# Patient Record
Sex: MALE | Race: WHITE
[De-identification: names, ages, dates, MRNs, and addresses within clinical notes are randomized per-mention and may not be internally consistent; named-entity substitution may affect disease eponyms.]

---

## 2020-03-24 ENCOUNTER — HOSPITAL ENCOUNTER (EMERGENCY)
Dept: HOSPITAL 25 - UCCORT | Age: 45
Discharge: HOME | End: 2020-03-24
Payer: SELF-PAY

## 2020-03-24 VITALS — DIASTOLIC BLOOD PRESSURE: 63 MMHG | SYSTOLIC BLOOD PRESSURE: 115 MMHG

## 2020-03-24 DIAGNOSIS — M79.10: ICD-10-CM

## 2020-03-24 DIAGNOSIS — R50.9: ICD-10-CM

## 2020-03-24 DIAGNOSIS — R07.89: ICD-10-CM

## 2020-03-24 DIAGNOSIS — F17.210: ICD-10-CM

## 2020-03-24 DIAGNOSIS — J02.9: Primary | ICD-10-CM

## 2020-03-24 DIAGNOSIS — Z20.828: ICD-10-CM

## 2020-03-24 DIAGNOSIS — R05: ICD-10-CM

## 2020-03-24 PROCEDURE — 99212 OFFICE O/P EST SF 10 MIN: CPT

## 2020-03-24 PROCEDURE — G0463 HOSPITAL OUTPT CLINIC VISIT: HCPCS

## 2020-03-24 PROCEDURE — 87651 STREP A DNA AMP PROBE: CPT

## 2020-03-24 PROCEDURE — U0002 COVID-19 LAB TEST NON-CDC: HCPCS

## 2020-03-24 NOTE — UC
FLU HPI





- HPI Summary


HPI Summary: 


43yo male presenting with sore throat, body aches, chills, and cough with 

sternal chest discomfort x3 days. Patient notes subjective fevers. Denies sob 

and wheezing. States chest discomfort worse with deep breaths and coughing. 

Denies discomfort currently. Notes significant fatigue and decreased appetite. 

Denies n/v/d. States his significant other was diagnosed with URI a few weeks 

ago. Denies other ill contacts. Denies known exposure to covid 19. Patient 

works at [employer]. Notes h/o asthma as child that he states he grew 

out of. Smokes 1.5 packs every two days and vapes throughout the day everyday.








- History of Current Complaint


Stated Complaint: SORE THROAT, COUGH


Hx Obtained From: Patient


Pain Intensity: 5


Pain Scale Used: 0-10 Numeric





- Allergy/Home Medications


Allergies/Adverse Reactions: 


 Allergies











Allergy/AdvReac Type Severity Reaction Status Date / Time


 


No Known Allergies Allergy   Verified 03/24/20 20:18











Home Medications: 


 Home Medications





Azithromycin 250 mg PO DAILY #4 tablet 03/24/20 [Rx]











PMH/Surg Hx/FS Hx/Imm Hx


Respiratory History: Asthma - as a child





- Surgical History


Surgical History: Yes


Surgery Procedure, Year, and Place: left wrist





- Family History


Known Family History: Positive: Non-Contributory





- Social History


Alcohol Use: Occasionally


Substance Use Type: None


Smoking Status (MU): Heavy Every Day Tobacco Smoker


Type: Cigarettes


Amount Used/How Often: 1 pack daily





Review of Systems


All Other Systems Reviewed And Are Negative: Yes


Constitutional: Positive: Fever - subjective, Chills, Fatigue


ENT: Positive: Sore Throat, Sinus Congestion - mild


Respiratory: Positive: Cough - productive yellow sputum, Other - mid sternal 

chest discomfort with deep breaths and coughing.  Negative: Shortness Of Breath


Cardiovascular: Positive: Negative


Gastrointestinal: Positive: Negative


Genitourinary: Positive: Negative


Musculoskeletal: Positive: Myalgia


Neurological/Mental Status: Positive: Headache





Physical Exam





- Summary


Physical Exam Summary: 


Vital Signs Reviewed: Yes


A+Ox3, no distress


Eyes: Conjunctiva mildly injected b/l


ENT: Hearing grossly normal, TM x 2 clear, moist, uvula midline, no exudate, 

mild pharyngeal erythema, no tonsillar swelling


Neck: Positive: Supple, mild tonsillar node swelling


Respiratory: Positive: No respiratory distress, No accessory muscle use + CTA 

throughout  no w/r


Cardiovascular: RRR  nl s1, s2  no m/r


Musculoskeletal Exam: DRAPER x 4 without difficulty, no TTP of sternum or ribs


Neurological: Positive: Alert


Psychological: Positive: age appropriate behavior


Skin: Positive: no rash, no ecchymosis








Vital Signs: 





 Vital Signs (72 hours)











  03/24/20





  20:20


 


Temperature 98.8 F


 


Pulse Rate 94


 


Respiratory 16





Rate 


 


Blood Pressure 115/63





(mmHg) 


 


O2 Sat by Pulse 98





Oximetry 








 Lab Results











  03/24/20 03/24/20 Range/Units





  20:43 20:46 


 


Influenza A (Rapid)   Negative  (Negative)  


 


Influenza B (Rapid)   Negative  (Negative)  


 


Group A Strep Rapid  Negative   (Negative)  














Flu Course/Dx





- Course


Course Of Treatment: 


Full PPE was used while assessing patient. Flu and strep tests were negative. I 

discussed results with the patient and informed him that he would receive the 

covid19 results within the next 3-5 days.  I discussed self quarantining until 

results have been received and he otherwise advised by a healthcare provider.  

I provided the patient with an inhaler. I also treated with azithromycin for 

possible bacterial infection based on sputum production and chest discomfort. 

He received the first dose here in the urgent care with the rest of 

prescription sent to pharmacy for a family member to  for him. 

Instructed to go to the ED if he experiences any new or worsening symptoms 

including worsening shortness of breath/difficulty breathing. Patient voiced 

understanding and agreed with treatment plan. All questions answered to the 

best of my abilities.








- Differential Dx/Diagnosis


Differential Diagnosis/HQI/PQRI: Bronchitis, Influenza, Pneumonia, Upper 

Respiratory Infection, Other - covid 19


Provider Diagnosis: 


 Flu-like symptoms








Discharge ED





- Sign-Out/Discharge


Documenting (check all that apply): Patient Departure


All imaging exams completed and their final reports reviewed: No Studies





- Discharge Plan


Condition: Stable


Disposition: HOME


Prescriptions: 


Azithromycin 250 mg PO DAILY #4 tablet


Patient Education Materials:  V[facility] (ED)


Forms:  COVID-19 Tested & Isolation


Referrals: 


Jabari Bustos MD [Medical Doctor] - 


Additional Instructions: 


As discussed, your strep and flu tests were negative today.


Take azithromycin as prescribed. You received the first dose at the urgent care 

tonight.


Use the inhaler as needed for shortness of breath/wheezing.


You have received testing for covid-19 and will be notified with the results 

within 3-5 days.


You need to self-quarantine for the next 14 days unless otherwise advised by a 

healthcare provider.


This means staying home and no contact with anyone who lives with you.


You should not share your bedroom or bathroom if possible.


You may continue with tylenol. Avoid ibuprofen (motrin) and other NSAIDs as 

this has been shown to possibly exacerbate covid19 symptoms.


Increase your fluid intake.


Go to the nearest emergency room or call 911 if you experience new or worsening 

symptoms.








- Billing Disposition and Condition


Condition: STABLE


Disposition: Home